# Patient Record
Sex: FEMALE | Race: BLACK OR AFRICAN AMERICAN | NOT HISPANIC OR LATINO
[De-identification: names, ages, dates, MRNs, and addresses within clinical notes are randomized per-mention and may not be internally consistent; named-entity substitution may affect disease eponyms.]

---

## 2019-05-01 PROBLEM — Z00.00 ENCOUNTER FOR PREVENTIVE HEALTH EXAMINATION: Status: ACTIVE | Noted: 2019-05-01

## 2019-05-17 ENCOUNTER — APPOINTMENT (OUTPATIENT)
Dept: VASCULAR SURGERY | Facility: CLINIC | Age: 59
End: 2019-05-17

## 2019-06-07 ENCOUNTER — APPOINTMENT (OUTPATIENT)
Dept: VASCULAR SURGERY | Facility: CLINIC | Age: 59
End: 2019-06-07
Payer: COMMERCIAL

## 2019-06-07 VITALS — DIASTOLIC BLOOD PRESSURE: 84 MMHG | OXYGEN SATURATION: 98 % | SYSTOLIC BLOOD PRESSURE: 132 MMHG | HEART RATE: 86 BPM

## 2019-06-07 PROCEDURE — 99212 OFFICE O/P EST SF 10 MIN: CPT

## 2019-06-13 NOTE — ASSESSMENT
[FreeTextEntry1] : 59 yo F was referred by her PCP to be evaluated for RLE edema.\par Patient states that she developed her edema some time ago and  over the years it became progressively worse.\par RLE severe edema/lymphedema.\par Since pt tried compression stockings with minimal relief, we recommend a lymphedema pump.\par We will request it for the patient.

## 2019-06-13 NOTE — HISTORY OF PRESENT ILLNESS
[FreeTextEntry1] : 59 yo F was referred by her PCP to be evaluated for RLE edema. Patient states that she developed her edema some time ago and  over the years it became progressively worse. She denies hx of DVT, varicose veins. She has been wearing compression stockings on and off but she is seeking other measures to control her edema. \par Patient is HIV positive and she is on HAART therapy.

## 2019-06-13 NOTE — PHYSICAL EXAM
[Normal Breath Sounds] : Normal breath sounds [Normal Heart Sounds] : normal heart sounds [2+] : right 2+ [1+] : right 1+ [Ankle Swelling (On Exam)] : present [Ankle Swelling On The Right] : of the right ankle [Ankle Swelling Bilaterally] : severe [No Rash or Lesion] : No rash or lesion [Alert] : alert [Calm] : calm [JVD] : no jugular venous distention  [Varicose Veins Of Lower Extremities] : not present [Abdomen Tenderness] : ~T ~M No abdominal tenderness [] : not present [de-identified] : FROM [de-identified] : NC/AT [de-identified] : WN/WD, NAD

## 2019-06-13 NOTE — REVIEW OF SYSTEMS
[Lower Ext Edema] : lower extremity edema [As Noted in HPI] : as noted in HPI [Limb Swelling] : limb swelling [Negative] : Heme/Lymph [Leg Claudication] : no intermittent leg claudication [Limb Pain] : no limb pain

## 2021-04-12 ENCOUNTER — APPOINTMENT (OUTPATIENT)
Dept: VASCULAR SURGERY | Facility: CLINIC | Age: 61
End: 2021-04-12
Payer: COMMERCIAL

## 2021-04-12 PROCEDURE — 99212 OFFICE O/P EST SF 10 MIN: CPT

## 2021-04-12 PROCEDURE — 99072 ADDL SUPL MATRL&STAF TM PHE: CPT

## 2021-04-16 NOTE — ADDENDUM
[FreeTextEntry1] : This note was written by Esperanza Humphrey on 04/12/2021 acting as scribe for Frantz Mustafa M.D.\par \par I, Frantz Lawson have read and attest that all the information, medical decision making and discharge instructions within are true and accurate.

## 2021-04-16 NOTE — HISTORY OF PRESENT ILLNESS
[FreeTextEntry1] : 59 y/o F w/hx of HIV positive on HARRT therapy RLE edema presents for follow up evaluation. Pt reports she lost to follow up on lymph edema pump installation. She would like to resume process as she continues to experience LE swelling. Denies any skin breakdown, skin discoloration,  fever or chills. Furthermore, pt requesting disability form for access a ride service to be provided.

## 2021-04-16 NOTE — PHYSICAL EXAM
[Respiratory Effort] : normal respiratory effort [Normal Rate and Rhythm] : normal rate and rhythm [2+] : left 2+ [Ankle Swelling (On Exam)] : present [Ankle Swelling Bilaterally] : bilaterally  [Ankle Swelling On The Left] : moderate [Alert] : alert [Oriented to Person] : oriented to person [Oriented to Place] : oriented to place [Oriented to Time] : oriented to time [Calm] : calm [Varicose Veins Of Lower Extremities] : not present [] : not present [de-identified] : Well appearing  [de-identified] : NC/AT  [de-identified] : Supple  [de-identified] : overweight  [de-identified] : FROM

## 2021-04-16 NOTE — ASSESSMENT
[FreeTextEntry1] : 61 y/o F with severe RLE edema/lymphedema. Recommended lymphedema pump given persistent swelling. I have explained to the pt to follow up with her PCP for disability forms and further access a ride paperwork. No vascular surgery interventions are recommended at this time. F/u PRN [Arterial/Venous Disease] : arterial/venous disease